# Patient Record
Sex: FEMALE | NOT HISPANIC OR LATINO | Employment: PART TIME | URBAN - METROPOLITAN AREA
[De-identification: names, ages, dates, MRNs, and addresses within clinical notes are randomized per-mention and may not be internally consistent; named-entity substitution may affect disease eponyms.]

---

## 2024-08-02 ENCOUNTER — HOSPITAL ENCOUNTER (OUTPATIENT)
Dept: RADIOLOGY | Facility: HOSPITAL | Age: 49
Discharge: HOME | End: 2024-08-02
Payer: COMMERCIAL

## 2024-08-02 DIAGNOSIS — M54.2 CERVICALGIA: ICD-10-CM

## 2024-08-02 PROCEDURE — 72050 X-RAY EXAM NECK SPINE 4/5VWS: CPT

## 2024-08-02 PROCEDURE — 72050 X-RAY EXAM NECK SPINE 4/5VWS: CPT | Performed by: RADIOLOGY

## 2024-08-11 ENCOUNTER — HOSPITAL ENCOUNTER (OUTPATIENT)
Dept: RADIOLOGY | Facility: HOSPITAL | Age: 49
Discharge: HOME | End: 2024-08-11
Payer: COMMERCIAL

## 2024-08-11 DIAGNOSIS — M54.2 CERVICALGIA: ICD-10-CM

## 2024-08-11 PROCEDURE — 72141 MRI NECK SPINE W/O DYE: CPT | Performed by: RADIOLOGY

## 2024-08-11 PROCEDURE — 72141 MRI NECK SPINE W/O DYE: CPT

## 2024-08-20 ENCOUNTER — APPOINTMENT (OUTPATIENT)
Dept: RADIOLOGY | Facility: HOSPITAL | Age: 49
End: 2024-08-20
Payer: COMMERCIAL

## 2024-08-22 ENCOUNTER — OFFICE VISIT (OUTPATIENT)
Dept: ORTHOPEDIC SURGERY | Facility: CLINIC | Age: 49
End: 2024-08-22
Payer: COMMERCIAL

## 2024-08-22 DIAGNOSIS — M19.049 CMC ARTHRITIS: Primary | ICD-10-CM

## 2024-08-22 PROCEDURE — 99204 OFFICE O/P NEW MOD 45 MIN: CPT | Performed by: ORTHOPAEDIC SURGERY

## 2024-08-22 PROCEDURE — 99214 OFFICE O/P EST MOD 30 MIN: CPT | Performed by: ORTHOPAEDIC SURGERY

## 2024-08-22 PROCEDURE — 1036F TOBACCO NON-USER: CPT | Performed by: ORTHOPAEDIC SURGERY

## 2024-08-22 RX ORDER — NARATRIPTAN 2.5 MG/1
1 TABLET ORAL DAILY
COMMUNITY

## 2024-08-22 RX ORDER — METFORMIN HYDROCHLORIDE 500 MG/1
TABLET ORAL
COMMUNITY
Start: 2024-01-10

## 2024-08-22 RX ORDER — PROGESTERONE 100 MG/1
CAPSULE ORAL
COMMUNITY

## 2024-08-22 RX ORDER — DEXTROAMPHETAMINE SACCHARATE, AMPHETAMINE ASPARTATE, DEXTROAMPHETAMINE SULFATE, AND AMPHETAMINE SULFATE 2.5; 2.5; 2.5; 2.5 MG/1; MG/1; MG/1; MG/1
TABLET ORAL
COMMUNITY

## 2024-08-22 RX ORDER — MELOXICAM 15 MG
TABLET ORAL
COMMUNITY
Start: 2024-07-17 | End: 2025-07-17

## 2024-08-22 RX ORDER — EPINEPHRINE 0.3 MG/.3ML
INJECTION SUBCUTANEOUS
COMMUNITY
Start: 2023-10-06

## 2024-08-22 RX ORDER — LEVOTHYROXINE SODIUM 62.5 UG/1
CAPSULE ORAL
COMMUNITY
Start: 2024-06-10

## 2024-08-22 RX ORDER — TESTOSTERONE 50 MG/5G
1 GEL TRANSDERMAL DAILY
COMMUNITY

## 2024-08-22 RX ORDER — MULTIVITAMIN
1 TABLET ORAL
COMMUNITY

## 2024-08-22 RX ORDER — HYDROXYZINE HYDROCHLORIDE 25 MG/1
25 TABLET, FILM COATED ORAL NIGHTLY PRN
COMMUNITY
Start: 2024-03-26

## 2024-08-22 RX ORDER — SODIUM CHLORIDE, SODIUM LACTATE, POTASSIUM CHLORIDE, CALCIUM CHLORIDE 600; 310; 30; 20 MG/100ML; MG/100ML; MG/100ML; MG/100ML
20 INJECTION, SOLUTION INTRAVENOUS CONTINUOUS
OUTPATIENT
Start: 2024-08-22

## 2024-08-22 RX ORDER — CLONAZEPAM 0.5 MG/1
1 TABLET ORAL DAILY
COMMUNITY

## 2024-08-22 RX ORDER — LEVOCETIRIZINE DIHYDROCHLORIDE 5 MG/1
TABLET, FILM COATED ORAL
COMMUNITY
Start: 2024-07-16

## 2024-08-22 RX ORDER — LIOTHYRONINE SODIUM 5 UG/1
TABLET ORAL
COMMUNITY

## 2024-08-22 RX ORDER — LEVOTHYROXINE SODIUM 25 UG/1
25 TABLET ORAL
COMMUNITY
Start: 2024-06-08

## 2024-08-22 RX ORDER — FLUTICASONE PROPIONATE 50 MCG
SPRAY, SUSPENSION (ML) NASAL
COMMUNITY

## 2024-08-22 ASSESSMENT — PATIENT HEALTH QUESTIONNAIRE - PHQ9
1. LITTLE INTEREST OR PLEASURE IN DOING THINGS: NOT AT ALL
2. FEELING DOWN, DEPRESSED OR HOPELESS: NOT AT ALL
SUM OF ALL RESPONSES TO PHQ9 QUESTIONS 1 AND 2: 0

## 2024-08-22 ASSESSMENT — ENCOUNTER SYMPTOMS
LOSS OF SENSATION IN FEET: 0
DEPRESSION: 0
OCCASIONAL FEELINGS OF UNSTEADINESS: 0

## 2024-08-22 NOTE — PROGRESS NOTES
"History of Present Illness:  Chief Complaint   Patient presents with    Right Wrist - Pain    Right Hand - Pain     Right thumb pain    Left Hand - Pain     Left thumb pain       The patient presents today for third opinion regarding bilateral basilar thumb pain.  Symptoms began about 8 years ago and have been progressive in nature.  Pain is worse with gripping, pinching and twisting.  The following treatments have been attempted: Corticosteroid injections and bracing.      History reviewed. No pertinent past medical history.    Medication Documentation Review Audit    **Prior to Admission medications have not yet been reviewed**         Allergies   Allergen Reactions    Mushroom Anaphylaxis, Other and Unknown     \"throat feels funny, bumps in mouth\"    Codeine Nausea/vomiting, GI Upset and Unknown     vomit    Colchicine GI intolerance and Unknown     Diarrhea, hangover feeling    Hydroxychloroquine Itching    Meloxicam Confusion, Dizziness and Headache    Mold Unknown and Other     patient states she gets bumps in her mouth    Sulfa (Sulfonamide Antibiotics) Hives and Unknown    Hydroxyzine Anxiety, Blurred vision, Confusion, Dizziness, Drowsiness, Headache and Other    Sulfamethoxazole-Trimethoprim Rash       Social History     Socioeconomic History    Marital status:      Spouse name: Not on file    Number of children: Not on file    Years of education: Not on file    Highest education level: Not on file   Occupational History    Not on file   Tobacco Use    Smoking status: Former     Types: Cigarettes    Smokeless tobacco: Never   Substance and Sexual Activity    Alcohol use: Yes    Drug use: Never    Sexual activity: Defer   Other Topics Concern    Not on file   Social History Narrative    Not on file     Social Determinants of Health     Financial Resource Strain: Not on file   Food Insecurity: Not on file   Transportation Needs: Not on file   Physical Activity: Patient Declined (7/25/2023)    [facility] " [facility]    Exercise Vital Sign     Days of Exercise per Week: Patient declined     Minutes of Exercise per Session: Patient declined   Stress: Stress Concern Present (7/25/2023)    Received from [facility] of Occupational Health - Occupational Stress Questionnaire     Feeling of Stress : Very much   Social Connections: Not on file   Intimate Partner Violence: Not on file   Housing Stability: Not on file       History reviewed. No pertinent surgical history.       Review of Systems   GENERAL: Negative for malaise, significant weight loss, fever  MUSCULOSKELETAL: see HPI  NEURO: Negative for numbness     Physical Examination:  Constitutional: Appears well-developed and well-nourished.  Head: Normocephalic and atraumatic.  Eyes: EOMI grossly  Cardiovascular: Intact distal pulses.   Respiratory: Effort normal. No respiratory distress.  Neurologic: Alert and oriented to person, place, and time.  Skin: Skin is warm and dry.  Hematologic / Lymphatic: No lymphedema, lymphangitis.  Psychiatric: normal mood and affect. Behavior is normal.   Musculoskeletal:  bilateral wrist/hand:  No obvious swelling or masses  No appreciable thenar atrophy  No atrophy noted of hypothenar eminence   Negative Apryl's/Phalens  Sensation grossly intact to all digits  5/5 thumb Abduction/Finger Abduction  Tenderness about thumb cmc joint with positive CMC grind.  Thumbs do have some passive MCP hyperextension, but resting in flexion.  No tenderness about first dorsal compartment/thumb A1 pulley region  Radial pulse palpable  Good capillary refill     Radiographs: Left thumb radiographs from June 24, 2024 available for my review demonstrate left thumb CMC degenerative changes.  There is some MCP hyperextension appreciated in this radiograph.     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  We discussed treatments of thumb CMC arthritis.  Non-operative modalities include symptomatic splinting, anti-inflammatories, activity modifications, hand therapy and corticosteroid injections.  We also discussed role of surgical intervention if conservative measures prove unsuccessful.    Patient has failed conservative treatment and is interested in pursuing surgical intervention.  Given the good resting posture of her MCP joint I do not believe that we will have to perform additional procedure at this time on the MCP joint.  We did discuss potential for gradual hyperextension and potential need for treatment for this.    Risks and benefits of continued nonoperative versus operative treatment options were reviewed.  The surgical benefits and the potential complications were reviewed with the patient today, as well as the day surgical setting and the likely postoperative course.  Patient understands that the goal of surgery is potential relief of some symptoms.  Risks discussed included, but were not limited to, residual/recurrent/worsening symptoms, pain, infection, bleeding, stiffness, injury to nerve/blood vessels/tendons, wound healing issues and possible need for reoperation.  I answered the patient's questions and surgical consent reviewed and obtained.  The patient has elected to proceed with surgery and we will schedule it at the patient's convenience.    The patient will followup with us in the operating room and then postoperatively.

## 2024-09-25 ENCOUNTER — LAB (OUTPATIENT)
Dept: LAB | Facility: LAB | Age: 49
End: 2024-09-25
Payer: COMMERCIAL

## 2024-09-25 DIAGNOSIS — Z00.01 ENCOUNTER FOR GENERAL ADULT MEDICAL EXAMINATION WITH ABNORMAL FINDINGS: ICD-10-CM

## 2024-09-25 DIAGNOSIS — E78.5 HYPERLIPIDEMIA, UNSPECIFIED: ICD-10-CM

## 2024-09-25 DIAGNOSIS — L65.9 NONSCARRING HAIR LOSS, UNSPECIFIED: ICD-10-CM

## 2024-09-25 DIAGNOSIS — E06.3 AUTOIMMUNE THYROIDITIS: Primary | ICD-10-CM

## 2024-09-25 DIAGNOSIS — E55.9 VITAMIN D DEFICIENCY, UNSPECIFIED: ICD-10-CM

## 2024-09-25 DIAGNOSIS — R53.83 OTHER FATIGUE: ICD-10-CM

## 2024-09-25 DIAGNOSIS — Z79.890 HORMONE REPLACEMENT THERAPY: ICD-10-CM

## 2024-09-25 DIAGNOSIS — M25.50 PAIN IN UNSPECIFIED JOINT: ICD-10-CM

## 2024-09-25 LAB
ALBUMIN SERPL BCP-MCNC: 4.3 G/DL (ref 3.4–5)
ALP SERPL-CCNC: 40 U/L (ref 33–110)
ALT SERPL W P-5'-P-CCNC: 17 U/L (ref 7–45)
ANION GAP SERPL CALC-SCNC: 12 MMOL/L (ref 10–20)
AST SERPL W P-5'-P-CCNC: 17 U/L (ref 9–39)
BASOPHILS # BLD AUTO: 0.04 X10*3/UL (ref 0–0.1)
BASOPHILS NFR BLD AUTO: 0.9 %
BILIRUB SERPL-MCNC: 0.4 MG/DL (ref 0–1.2)
BUN SERPL-MCNC: 15 MG/DL (ref 6–23)
C3 SERPL-MCNC: 92 MG/DL (ref 87–200)
C4 SERPL-MCNC: 24 MG/DL (ref 10–50)
CALCIUM SERPL-MCNC: 9.5 MG/DL (ref 8.6–10.3)
CCP IGG SERPL-ACNC: <1 U/ML
CHLORIDE SERPL-SCNC: 105 MMOL/L (ref 98–107)
CHOLEST SERPL-MCNC: 266 MG/DL (ref 0–199)
CHOLESTEROL/HDL RATIO: 3.5
CO2 SERPL-SCNC: 26 MMOL/L (ref 21–32)
CREAT SERPL-MCNC: 0.91 MG/DL (ref 0.5–1.05)
CRP SERPL HS-MCNC: 1.3 MG/L
DHEA-S SERPL-MCNC: 99 UG/DL (ref 12–379)
EGFRCR SERPLBLD CKD-EPI 2021: 77 ML/MIN/1.73M*2
EOSINOPHIL # BLD AUTO: 0.1 X10*3/UL (ref 0–0.7)
EOSINOPHIL NFR BLD AUTO: 2.2 %
ERYTHROCYTE [DISTWIDTH] IN BLOOD BY AUTOMATED COUNT: 12.6 % (ref 11.5–14.5)
ESTRADIOL SERPL-MCNC: 94 PG/ML
FERRITIN SERPL-MCNC: 119 NG/ML (ref 8–150)
GLUCOSE SERPL-MCNC: 94 MG/DL (ref 74–99)
HCT VFR BLD AUTO: 40.7 % (ref 36–46)
HDLC SERPL-MCNC: 76.2 MG/DL
HGB BLD-MCNC: 13.4 G/DL (ref 12–16)
IMM GRANULOCYTES # BLD AUTO: 0.02 X10*3/UL (ref 0–0.7)
IMM GRANULOCYTES NFR BLD AUTO: 0.4 % (ref 0–0.9)
LDLC SERPL CALC-MCNC: 174 MG/DL
LYMPHOCYTES # BLD AUTO: 1.42 X10*3/UL (ref 1.2–4.8)
LYMPHOCYTES NFR BLD AUTO: 31.3 %
MCH RBC QN AUTO: 30.9 PG (ref 26–34)
MCHC RBC AUTO-ENTMCNC: 32.9 G/DL (ref 32–36)
MCV RBC AUTO: 94 FL (ref 80–100)
MONOCYTES # BLD AUTO: 0.48 X10*3/UL (ref 0.1–1)
MONOCYTES NFR BLD AUTO: 10.6 %
NEUTROPHILS # BLD AUTO: 2.47 X10*3/UL (ref 1.2–7.7)
NEUTROPHILS NFR BLD AUTO: 54.6 %
NON HDL CHOLESTEROL: 190 MG/DL (ref 0–149)
NRBC BLD-RTO: 0 /100 WBCS (ref 0–0)
PLATELET # BLD AUTO: 289 X10*3/UL (ref 150–450)
POTASSIUM SERPL-SCNC: 4.6 MMOL/L (ref 3.5–5.3)
PROGEST SERPL-MCNC: 13.6 NG/ML
PROT SERPL-MCNC: 6.4 G/DL (ref 6.4–8.2)
RBC # BLD AUTO: 4.33 X10*6/UL (ref 4–5.2)
RHEUMATOID FACT SER NEPH-ACNC: <10 IU/ML (ref 0–15)
SODIUM SERPL-SCNC: 138 MMOL/L (ref 136–145)
TRIGL SERPL-MCNC: 77 MG/DL (ref 0–149)
TSH SERPL-ACNC: 1.24 MIU/L (ref 0.44–3.98)
VLDL: 15 MG/DL (ref 0–40)
WBC # BLD AUTO: 4.5 X10*3/UL (ref 4.4–11.3)

## 2024-09-25 PROCEDURE — 82670 ASSAY OF TOTAL ESTRADIOL: CPT

## 2024-09-25 PROCEDURE — 86038 ANTINUCLEAR ANTIBODIES: CPT

## 2024-09-25 PROCEDURE — 84402 ASSAY OF FREE TESTOSTERONE: CPT

## 2024-09-25 PROCEDURE — 85025 COMPLETE CBC W/AUTO DIFF WBC: CPT

## 2024-09-25 PROCEDURE — 36415 COLL VENOUS BLD VENIPUNCTURE: CPT

## 2024-09-25 PROCEDURE — 82642 DIHYDROTESTOSTERONE: CPT

## 2024-09-25 PROCEDURE — 84144 ASSAY OF PROGESTERONE: CPT

## 2024-09-25 PROCEDURE — 86160 COMPLEMENT ANTIGEN: CPT

## 2024-09-25 PROCEDURE — 86431 RHEUMATOID FACTOR QUANT: CPT

## 2024-09-25 PROCEDURE — 86200 CCP ANTIBODY: CPT

## 2024-09-25 PROCEDURE — 80061 LIPID PANEL: CPT

## 2024-09-25 PROCEDURE — 82627 DEHYDROEPIANDROSTERONE: CPT

## 2024-09-25 PROCEDURE — 80053 COMPREHEN METABOLIC PANEL: CPT

## 2024-09-25 PROCEDURE — 84443 ASSAY THYROID STIM HORMONE: CPT

## 2024-09-25 PROCEDURE — 82679 ASSAY OF ESTRONE: CPT

## 2024-09-25 PROCEDURE — 86141 C-REACTIVE PROTEIN HS: CPT

## 2024-09-25 PROCEDURE — 82728 ASSAY OF FERRITIN: CPT

## 2024-09-26 LAB — ANA SER QL HEP2 SUBST: NEGATIVE

## 2024-09-28 LAB — ANDROSTANOLONE SERPL-MCNC: 115.7 PG/ML (ref 24–208)

## 2024-09-29 LAB
ESTRONE SERPL-MCNC: 58 PG/ML
TESTOSTERONE FREE (CHAN): 4.8 PG/ML (ref 0.1–6.4)
TESTOSTERONE,TOTAL,LC-MS/MS: 85 NG/DL (ref 2–45)

## 2024-10-17 ENCOUNTER — ANESTHESIA EVENT (OUTPATIENT)
Dept: OPERATING ROOM | Facility: HOSPITAL | Age: 49
End: 2024-10-17
Payer: COMMERCIAL

## 2024-10-17 RX ORDER — ONDANSETRON HYDROCHLORIDE 2 MG/ML
4 INJECTION, SOLUTION INTRAVENOUS ONCE AS NEEDED
Status: CANCELLED | OUTPATIENT
Start: 2024-10-17

## 2024-10-17 RX ORDER — OXYCODONE HYDROCHLORIDE 5 MG/1
5 TABLET ORAL EVERY 4 HOURS PRN
Status: CANCELLED | OUTPATIENT
Start: 2024-10-17

## 2024-10-17 RX ORDER — SODIUM CHLORIDE, SODIUM LACTATE, POTASSIUM CHLORIDE, CALCIUM CHLORIDE 600; 310; 30; 20 MG/100ML; MG/100ML; MG/100ML; MG/100ML
100 INJECTION, SOLUTION INTRAVENOUS CONTINUOUS
Status: CANCELLED | OUTPATIENT
Start: 2024-10-17 | End: 2024-10-18

## 2024-10-17 RX ORDER — DIPHENHYDRAMINE HYDROCHLORIDE 50 MG/ML
12.5 INJECTION INTRAMUSCULAR; INTRAVENOUS ONCE AS NEEDED
Status: CANCELLED | OUTPATIENT
Start: 2024-10-17

## 2024-10-17 RX ORDER — MEPERIDINE HYDROCHLORIDE 25 MG/ML
12.5 INJECTION INTRAMUSCULAR; INTRAVENOUS; SUBCUTANEOUS EVERY 10 MIN PRN
Status: CANCELLED | OUTPATIENT
Start: 2024-10-17

## 2024-10-17 RX ORDER — ALBUTEROL SULFATE 0.83 MG/ML
2.5 SOLUTION RESPIRATORY (INHALATION) ONCE AS NEEDED
Status: CANCELLED | OUTPATIENT
Start: 2024-10-17

## 2024-10-17 RX ORDER — DROPERIDOL 2.5 MG/ML
0.62 INJECTION, SOLUTION INTRAMUSCULAR; INTRAVENOUS ONCE AS NEEDED
Status: CANCELLED | OUTPATIENT
Start: 2024-10-17

## 2024-10-18 ENCOUNTER — HOSPITAL ENCOUNTER (OUTPATIENT)
Facility: HOSPITAL | Age: 49
Setting detail: OUTPATIENT SURGERY
Discharge: HOME | End: 2024-10-18
Attending: ORTHOPAEDIC SURGERY | Admitting: ORTHOPAEDIC SURGERY
Payer: COMMERCIAL

## 2024-10-18 ENCOUNTER — ANESTHESIA (OUTPATIENT)
Dept: OPERATING ROOM | Facility: HOSPITAL | Age: 49
End: 2024-10-18
Payer: COMMERCIAL

## 2024-10-18 ENCOUNTER — PHARMACY VISIT (OUTPATIENT)
Dept: PHARMACY | Facility: CLINIC | Age: 49
End: 2024-10-18
Payer: COMMERCIAL

## 2024-10-18 VITALS
RESPIRATION RATE: 18 BRPM | OXYGEN SATURATION: 100 % | WEIGHT: 136.69 LBS | SYSTOLIC BLOOD PRESSURE: 122 MMHG | DIASTOLIC BLOOD PRESSURE: 84 MMHG | BODY MASS INDEX: 25.15 KG/M2 | HEART RATE: 69 BPM | HEIGHT: 62 IN | TEMPERATURE: 97 F

## 2024-10-18 DIAGNOSIS — M19.049 CMC ARTHRITIS: Primary | ICD-10-CM

## 2024-10-18 PROBLEM — F90.9 ADHD: Status: ACTIVE | Noted: 2024-10-18

## 2024-10-18 PROBLEM — E03.9 HYPOTHYROIDISM: Status: ACTIVE | Noted: 2024-10-18

## 2024-10-18 PROCEDURE — 3600000003 HC OR TIME - INITIAL BASE CHARGE - PROCEDURE LEVEL THREE: Performed by: ORTHOPAEDIC SURGERY

## 2024-10-18 PROCEDURE — 7100000010 HC PHASE TWO TIME - EACH INCREMENTAL 1 MINUTE: Performed by: ORTHOPAEDIC SURGERY

## 2024-10-18 PROCEDURE — 25447 ARTHRP NTRCRP/CRP/MTCR NTRPS: CPT | Performed by: ORTHOPAEDIC SURGERY

## 2024-10-18 PROCEDURE — C1713 ANCHOR/SCREW BN/BN,TIS/BN: HCPCS | Performed by: ORTHOPAEDIC SURGERY

## 2024-10-18 PROCEDURE — 2500000004 HC RX 250 GENERAL PHARMACY W/ HCPCS (ALT 636 FOR OP/ED): Performed by: NURSE ANESTHETIST, CERTIFIED REGISTERED

## 2024-10-18 PROCEDURE — 2500000001 HC RX 250 WO HCPCS SELF ADMINISTERED DRUGS (ALT 637 FOR MEDICARE OP): Performed by: ORTHOPAEDIC SURGERY

## 2024-10-18 PROCEDURE — 2500000004 HC RX 250 GENERAL PHARMACY W/ HCPCS (ALT 636 FOR OP/ED): Performed by: ANESTHESIOLOGY

## 2024-10-18 PROCEDURE — 7100000009 HC PHASE TWO TIME - INITIAL BASE CHARGE: Performed by: ORTHOPAEDIC SURGERY

## 2024-10-18 PROCEDURE — 7100000001 HC RECOVERY ROOM TIME - INITIAL BASE CHARGE: Performed by: ORTHOPAEDIC SURGERY

## 2024-10-18 PROCEDURE — 26480 TRANSPLANT HAND TENDON: CPT | Performed by: ORTHOPAEDIC SURGERY

## 2024-10-18 PROCEDURE — 3600000008 HC OR TIME - EACH INCREMENTAL 1 MINUTE - PROCEDURE LEVEL THREE: Performed by: ORTHOPAEDIC SURGERY

## 2024-10-18 PROCEDURE — 3700000002 HC GENERAL ANESTHESIA TIME - EACH INCREMENTAL 1 MINUTE: Performed by: ORTHOPAEDIC SURGERY

## 2024-10-18 PROCEDURE — RXMED WILLOW AMBULATORY MEDICATION CHARGE

## 2024-10-18 PROCEDURE — 2500000004 HC RX 250 GENERAL PHARMACY W/ HCPCS (ALT 636 FOR OP/ED): Performed by: STUDENT IN AN ORGANIZED HEALTH CARE EDUCATION/TRAINING PROGRAM

## 2024-10-18 PROCEDURE — 2500000005 HC RX 250 GENERAL PHARMACY W/O HCPCS: Performed by: ORTHOPAEDIC SURGERY

## 2024-10-18 PROCEDURE — 2500000004 HC RX 250 GENERAL PHARMACY W/ HCPCS (ALT 636 FOR OP/ED): Performed by: ORTHOPAEDIC SURGERY

## 2024-10-18 PROCEDURE — 3700000001 HC GENERAL ANESTHESIA TIME - INITIAL BASE CHARGE: Performed by: ORTHOPAEDIC SURGERY

## 2024-10-18 PROCEDURE — 2780000003 HC OR 278 NO HCPCS: Performed by: ORTHOPAEDIC SURGERY

## 2024-10-18 PROCEDURE — 7100000002 HC RECOVERY ROOM TIME - EACH INCREMENTAL 1 MINUTE: Performed by: ORTHOPAEDIC SURGERY

## 2024-10-18 PROCEDURE — 2720000007 HC OR 272 NO HCPCS: Performed by: ORTHOPAEDIC SURGERY

## 2024-10-18 DEVICE — H/W INTERNALBRACE LGMNT AUGMNT REPR KIT
Type: IMPLANTABLE DEVICE | Site: WRIST | Status: FUNCTIONAL
Brand: ARTHREX®

## 2024-10-18 RX ORDER — LIDOCAINE HYDROCHLORIDE 20 MG/ML
INJECTION, SOLUTION INFILTRATION; PERINEURAL AS NEEDED
Status: DISCONTINUED | OUTPATIENT
Start: 2024-10-18 | End: 2024-10-18

## 2024-10-18 RX ORDER — SODIUM CHLORIDE, SODIUM LACTATE, POTASSIUM CHLORIDE, CALCIUM CHLORIDE 600; 310; 30; 20 MG/100ML; MG/100ML; MG/100ML; MG/100ML
20 INJECTION, SOLUTION INTRAVENOUS CONTINUOUS
Status: DISCONTINUED | OUTPATIENT
Start: 2024-10-18 | End: 2024-10-18 | Stop reason: HOSPADM

## 2024-10-18 RX ORDER — CEFAZOLIN 1 G/1
INJECTION, POWDER, FOR SOLUTION INTRAVENOUS AS NEEDED
Status: DISCONTINUED | OUTPATIENT
Start: 2024-10-18 | End: 2024-10-18

## 2024-10-18 RX ORDER — KETOROLAC TROMETHAMINE 30 MG/ML
INJECTION, SOLUTION INTRAMUSCULAR; INTRAVENOUS AS NEEDED
Status: DISCONTINUED | OUTPATIENT
Start: 2024-10-18 | End: 2024-10-18

## 2024-10-18 RX ORDER — PROPOFOL 10 MG/ML
INJECTION, EMULSION INTRAVENOUS AS NEEDED
Status: DISCONTINUED | OUTPATIENT
Start: 2024-10-18 | End: 2024-10-18

## 2024-10-18 RX ORDER — OXYCODONE HYDROCHLORIDE 5 MG/1
5 TABLET ORAL EVERY 6 HOURS PRN
Qty: 18 TABLET | Refills: 0 | Status: SHIPPED | OUTPATIENT
Start: 2024-10-18

## 2024-10-18 RX ORDER — PROGESTERONE 100 MG/1
75 CAPSULE ORAL DAILY
COMMUNITY

## 2024-10-18 RX ORDER — MIDAZOLAM HYDROCHLORIDE 1 MG/ML
INJECTION, SOLUTION INTRAMUSCULAR; INTRAVENOUS AS NEEDED
Status: DISCONTINUED | OUTPATIENT
Start: 2024-10-18 | End: 2024-10-18

## 2024-10-18 RX ORDER — DEXMEDETOMIDINE IN 0.9 % NACL 20 MCG/5ML
SYRINGE (ML) INTRAVENOUS AS NEEDED
Status: DISCONTINUED | OUTPATIENT
Start: 2024-10-18 | End: 2024-10-18

## 2024-10-18 RX ORDER — SODIUM CHLORIDE 0.9 G/100ML
IRRIGANT IRRIGATION AS NEEDED
Status: DISCONTINUED | OUTPATIENT
Start: 2024-10-18 | End: 2024-10-18 | Stop reason: HOSPADM

## 2024-10-18 RX ORDER — FENTANYL CITRATE 50 UG/ML
INJECTION, SOLUTION INTRAMUSCULAR; INTRAVENOUS AS NEEDED
Status: DISCONTINUED | OUTPATIENT
Start: 2024-10-18 | End: 2024-10-18

## 2024-10-18 RX ORDER — LIOTHYRONINE SODIUM 5 UG/1
5 TABLET ORAL DAILY
COMMUNITY

## 2024-10-18 RX ORDER — CHLORHEXIDINE GLUCONATE 40 MG/ML
SOLUTION TOPICAL AS NEEDED
Status: DISCONTINUED | OUTPATIENT
Start: 2024-10-18 | End: 2024-10-18 | Stop reason: HOSPADM

## 2024-10-18 RX ORDER — TESTOSTERONE GEL, 1% 10 MG/G
50 GEL TRANSDERMAL DAILY
COMMUNITY

## 2024-10-18 RX ORDER — ONDANSETRON HYDROCHLORIDE 2 MG/ML
INJECTION, SOLUTION INTRAVENOUS AS NEEDED
Status: DISCONTINUED | OUTPATIENT
Start: 2024-10-18 | End: 2024-10-18

## 2024-10-18 SDOH — HEALTH STABILITY: MENTAL HEALTH: CURRENT SMOKER: 0

## 2024-10-18 ASSESSMENT — PAIN SCALES - GENERAL
PAINLEVEL_OUTOF10: 0 - NO PAIN

## 2024-10-18 ASSESSMENT — PAIN - FUNCTIONAL ASSESSMENT
PAIN_FUNCTIONAL_ASSESSMENT: 0-10

## 2024-10-18 NOTE — ANESTHESIA PREPROCEDURE EVALUATION
Patient: Sandra Delgado    Procedure Information       Date/Time: 10/18/24 0915    Procedure: THUMB BASILAR JOINT ARTHROPLASTY (Left: Wrist) - with regional block    Location: [facility] / [facility]    Surgeons: Bishop Castro MD            Relevant Problems   Anesthesia (within normal limits)      Cardiac (within normal limits)      Pulmonary (within normal limits)      Endocrine   (+) Hypothyroidism      Musculoskeletal   (+) CMC arthritis       Clinical information reviewed:   Tobacco  Allergies  Meds   Med Hx  Surg Hx  OB Status  Fam Hx  Soc   Hx        NPO Detail:  NPO/Void Status  Carbohydrate Drink Given Prior to Surgery? : N  Date of Last Liquid: 10/17/24  Time of Last Liquid: 2300  Date of Last Solid: 10/17/24  Time of Last Solid: 2300  Last Intake Type: Clear fluids  Time of Last Void: 0700         Physical Exam    Airway  Mallampati: II  TM distance: >3 FB  Neck ROM: full     Cardiovascular - normal exam     Dental - normal exam     Pulmonary - normal exam     Abdominal - normal exam             Anesthesia Plan    History of general anesthesia?: yes  History of complications of general anesthesia?: no    ASA 2     general and regional     The patient is not a current smoker.    intravenous induction   Postoperative administration of opioids is intended.  Trial extubation is planned.  Anesthetic plan and risks discussed with patient.  Use of blood products discussed with patient who consented to blood products.    Plan discussed with CRNA and attending.

## 2024-10-18 NOTE — ANESTHESIA PROCEDURE NOTES
Peripheral Block    Patient location during procedure: pre-op  Start time: 10/18/2024 9:00 AM  End time: 10/18/2024 9:15 AM  Reason for block: at surgeon's request and post-op pain management  Staffing  Performed: attending   Authorized by: Jasmin Dacosta MD    Performed by: Jasmin Dacosta MD  Preanesthetic Checklist  Completed: patient identified, IV checked, site marked, risks and benefits discussed, surgical consent, monitors and equipment checked, pre-op evaluation and timeout performed   Timeout performed at: 10/18/2024 9:00 AM  Peripheral Block  Patient position: laying flat  Prep: ChloraPrep  Patient monitoring: heart rate and continuous pulse ox  Block type: infraclavicular  Laterality: left  Injection technique: single-shot  Guidance: ultrasound guided  Local infiltration: lidocaine  Needle  Needle type: short-bevel   Needle gauge: 26 G  Needle length: 5 cm  Needle localization: ultrasound guidance  Assessment  Injection assessment: negative aspiration for heme, no paresthesia on injection, incremental injection and local visualized surrounding nerve on ultrasound  Paresthesia pain: none  Heart rate change: no  Additional Notes  Injected 20cc of bupivicaine 0.5%   2mg of versed preop

## 2024-10-18 NOTE — H&P
"History of Present Illness:       Chief Complaint   Patient presents with    Right Wrist - Pain    Right Hand - Pain       Right thumb pain    Left Hand - Pain       Left thumb pain         Patient presents for thumb CMC arthroplasty, left hand.  She has failed conservative treatment.     Medical History   History reviewed. No pertinent past medical history.        Medication Documentation Review Audit    **Prior to Admission medications have not yet been reviewed**            RX Allergies         Allergies   Allergen Reactions    Mushroom Anaphylaxis, Other and Unknown       \"throat feels funny, bumps in mouth\"    Codeine Nausea/vomiting, GI Upset and Unknown       vomit    Colchicine GI intolerance and Unknown       Diarrhea, hangover feeling    Hydroxychloroquine Itching    Meloxicam Confusion, Dizziness and Headache    Mold Unknown and Other       patient states she gets bumps in her mouth    Sulfa (Sulfonamide Antibiotics) Hives and Unknown    Hydroxyzine Anxiety, Blurred vision, Confusion, Dizziness, Drowsiness, Headache and Other    Sulfamethoxazole-Trimethoprim Rash            Social History               Socioeconomic History    Marital status:        Spouse name: Not on file    Number of children: Not on file    Years of education: Not on file    Highest education level: Not on file   Occupational History    Not on file   Tobacco Use    Smoking status: Former       Types: Cigarettes    Smokeless tobacco: Never   Substance and Sexual Activity    Alcohol use: Yes    Drug use: Never    Sexual activity: Defer   Other Topics Concern    Not on file   Social History Narrative    Not on file      Social Determinants of Health           Financial Resource Strain: Not on file   Food Insecurity: Not on file   Transportation Needs: Not on file   Physical Activity: Patient Declined (7/25/2023)     [facility]     Exercise Vital Sign      Days of Exercise per Week: Patient " declined      Minutes of Exercise per Session: Patient declined   Stress: Stress Concern Present (7/25/2023)     Received from [facility] [place] of Occupational Health - Occupational Stress Questionnaire      Feeling of Stress : Very much   Social Connections: Not on file   Intimate Partner Violence: Not on file   Housing Stability: Not on file            Surgical History   History reviewed. No pertinent surgical history.           Review of Systems   GENERAL: Negative for malaise, significant weight loss, fever  MUSCULOSKELETAL: see HPI  NEURO: Negative for numbness     Physical Examination:  Constitutional: Appears well-developed and well-nourished.  Head: Normocephalic and atraumatic.  Eyes: EOMI grossly  Cardiovascular: Intact distal pulses.   Respiratory: Effort normal. No respiratory distress.  Neurologic: Alert and oriented to person, place, and time.  Skin: Skin is warm and dry.  Hematologic / Lymphatic: No lymphedema, lymphangitis.  Psychiatric: normal mood and affect. Behavior is normal.   Musculoskeletal:  bilateral wrist/hand:  No obvious swelling or masses  No appreciable thenar atrophy  No atrophy noted of hypothenar eminence   Negative Apryl's/Phalens  Sensation grossly intact to all digits  5/5 thumb Abduction/Finger Abduction  Tenderness about thumb cmc joint with positive CMC grind.  Thumbs do have some passive MCP hyperextension, but resting in flexion.  No tenderness about first dorsal compartment/thumb A1 pulley region  Radial pulse palpable  Good capillary refill     Radiographs: Left thumb radiographs from June 24, 2024 available for my review demonstrate left thumb CMC degenerative changes.  There is some MCP hyperextension appreciated in this radiograph.     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

## 2024-10-18 NOTE — ANESTHESIA POSTPROCEDURE EVALUATION
Patient: Sandra Delgado    Procedure Summary       Date: 10/18/24 Room / Location: [facility] / [facility]    Anesthesia Start: 0929 Anesthesia Stop: 1055    Procedure: THUMB BASILAR JOINT ARTHROPLASTY (Left: Wrist) Diagnosis:       CMC arthritis      (CMC arthritis [M19.049])    Surgeons: Bishop Castro MD Responsible Provider: Jasmin Dacosta MD    Anesthesia Type: general, regional ASA Status: 2            Anesthesia Type: general, regional    Vitals Value Taken Time   /79 10/18/24 1120   Temp 36.1 °C (97 °F) 10/18/24 1055   Pulse 79 10/18/24 1120   Resp 17 10/18/24 1120   SpO2 99 % 10/18/24 1120       Anesthesia Post Evaluation    Patient location during evaluation: PACU  Patient participation: complete - patient participated  Level of consciousness: awake and alert  Pain management: adequate  Airway patency: patent  Cardiovascular status: acceptable  Respiratory status: acceptable  Hydration status: acceptable  Postoperative Nausea and Vomiting: none        There were no known notable events for this encounter.

## 2024-10-18 NOTE — OP NOTE
Pre-Operative Diagnosis: Left thumb basilar joint arthritis  Post-Operative Diagnosis: same  Procedure: Left thumb basilar joint arthroplasty with APL tendon transfer  Surgeon: Matthew  Assistant: Gaston  Anesthesia: General With regional block  Complications: none  Estimated blood loss: Minimal  Specimen: None  Implants:  Implant Name Type Inv. Item Serial No.  Lot No. LRB No. Used Action   REPAIR KIT, LIGAMENT AUGMENTATION, HAND/WRIST INTERNABRACE - [id number] Implant REPAIR KIT, LIGAMENT AUGMENTATION, HAND/WRIST INTERNABRACE  [employer] [id number] Left 1 Implanted     Findings: See below  Disposition: Good/PACU      Indications: Patient with symptomatic left thumb CMC arthritis that is failed response to conservative treatment.  After thoroughly reviewing associated risks and benefits patient wished to proceed with surgical intervention.  Expected operative and postoperative courses reviewed and questions addressed.    Operative course: Patient was greeted in the preoperative holding area and the operative site was marked with indelible marker.  Regional block was performed by the anesthesia team in the preoperative holding area.  Patient was then brought back to the operating room suite where general anesthetic was induced by the anesthesia team.  Left upper extremities prepped and draped in standard sterile fashion timeout procedure was performed as per standard protocol.  Esmarch was used to exsanguinate left upper extremity and upper arm tourniquet was inflated.  Longitudinal incision was made directly overlying the thumb CMC joint at the border between the rugal and non-rugal skin.  Gentle spreading was carried down through the subcutaneous tissues and 2 branches of the dorsal radial sensory nerve were identified and protected with gentle blunt retraction throughout the remainder of the case.  The thumb CMC capsule was then sharply incised in line with the incision and capsule was elevated off of  the trapezium.  The trapezium was then excised with rongeur after carefully elevating off the capsule.  FCR was identified and protected.  The scaphoid-trapezoid joint was inspected and had good cartilage without any notable signs of degenerative changes.  The first dorsal compartment tendons were then traced proximally.  While carefully protecting the radial sensory nerve and its branches the first dorsal compartment was released volarly.  A strip of the APL was then harvested, leaving its thumb metacarpal attachment intact.  An anchor was placed into the base of the thumb metacarpal with suture tape.  A second anchor was then placed at the base of the second metacarpal and the other end of the suture tape as well as the APL tendon were inserted into the base of the second metacarpal.  This was performed while holding the thumb in gentle axial traction and with the thumb adducted to the index finger.  Following this the thumb remained stable to axial load and had excellent abduction/adduction as well as flexion/extension.  Hand was able to lay flat on the table and tip of thumb was able to oppose to the MCP flexion crease at the base of the small finger.  Wound was then irrigated and capsule was repaired with 3-0 Vicryl in a buried interrupted fashion.  Skin was reapproximated with 4-0 Monocryl followed by Prineo mesh on the skin.  Dry sterile dressings were applied followed by thumb spica splint holding the thumb in appropriate positioning.  Patient was awoken from anesthesia uneventfully and taken the recovery for further care.    She will follow-up in 2 weeks for wound check and plan on transitioning into hand therapy fabricated forearm-based thumb spica Thermoplast splint.  She will remain nonweightbearing to the thumb and begin active motion at 4 weeks postoperatively.

## 2024-10-18 NOTE — DISCHARGE INSTRUCTIONS
Dr. Castro Post-Operative Instructions  Hand/Wrist/Elbow    Activity:  Rest on the day of surgery.  Gradually resume your regular diet, beginning with clear liquids and progressing as you feel ready.  No driving if you had anesthesia.    Anesthesia:  You may feel dizzy, sleepy or lightheaded for up to 24 hours after surgery.  If you had general anesthesia you may have a sore throat for 1-2 days.  If you had a nerve block wear a sling until nerve function returns for your safety.  Nerve block may last 18-36 hours.    Post-Operative Medications:  You may resume your regular medications (including blood thinners if you stopped them).  You have been given a prescription for pain medication as needed.  You may also take over-the-counter anti-inflammatories and/or Tylenol for pain relief.  If you are taking the prescribed pain medication you must limit additional Tylenol (acetaminophen) intake to avoid overdose.    Dressings:  Keep your splint clean and dry.  You may cover with a plastic bag or cast cover and seal bag to your skin above the bandage for showering.  If your dressing becomes wet or significantly bloodstained call the office at [phone].    Post-Operative Care:  Keep the surgical site elevated above the level of your heart to limit swelling.  If your fingers are not included in the dressing you are encouraged to move your fingers regularly (full fist and full extension).  Regularly ice the surgical site.  You may also apply ice pack above the level of the dressing and this will cool the blood as it travels towards the surgical site.    Call Surgeon/Office at any time for:           Office Number: [phone]  Excessive bleeding  Loss of feeling (The local numbing medicine from surgery typically lasts 8-12 hours.  Nerve blocks can last 18-36 hours.)  Tight dressing: Make sure that you are elevating the operative site appropriately.  If no relief then call the office.                                                                                                         Circulation issues:  If fingers change to white or blue  Concerns/Problems with your surgery

## 2024-10-18 NOTE — ANESTHESIA PROCEDURE NOTES
Airway  Date/Time: 10/18/2024 9:33 AM  Urgency: elective    Airway not difficult    Staffing  Performed: CRNA   Authorized by: Jasmin Dacosta MD    Performed by: JORGE Calix-ELLIE  Patient location during procedure: OR    Indications and Patient Condition  Indications for airway management: anesthesia  Spontaneous Ventilation: absent  Sedation level: deep  Preoxygenated: yes  Patient position: sniffing  Mask difficulty assessment: 1 - vent by mask  Planned trial extubation    Final Airway Details  Final airway type: supraglottic airway      Successful airway: Size 4     Number of attempts at approach: 1    Additional Comments  Igel easy mask ventilation and easy atraumatic LMA placement.

## 2024-10-22 ENCOUNTER — TELEPHONE (OUTPATIENT)
Dept: ORTHOPEDIC SURGERY | Facility: CLINIC | Age: 49
End: 2024-10-22
Payer: COMMERCIAL

## 2024-10-22 NOTE — TELEPHONE ENCOUNTER
10/18/24 LT THUMB BASILAR JOINT ARTHROPLASTY   Patient called and wants to know if she should continue to take the Aleve, she was prescribed Prednisone for swelling in her eye from Dermatology. She wasn't sure if the Prednisone would help her hand as well so she wouldn't need the Aleve. Her Derm told her to ask us.

## 2024-10-22 NOTE — TELEPHONE ENCOUNTER
Spoke with Dr. Castro and he said that is fine. Spoke with patient and she has verbalized understanding.

## 2024-10-31 ENCOUNTER — LAB (OUTPATIENT)
Dept: LAB | Facility: LAB | Age: 49
End: 2024-10-31
Payer: COMMERCIAL

## 2024-10-31 ENCOUNTER — OFFICE VISIT (OUTPATIENT)
Dept: ORTHOPEDIC SURGERY | Facility: CLINIC | Age: 49
End: 2024-10-31
Payer: COMMERCIAL

## 2024-10-31 ENCOUNTER — EVALUATION (OUTPATIENT)
Dept: OCCUPATIONAL THERAPY | Facility: CLINIC | Age: 49
End: 2024-10-31
Payer: COMMERCIAL

## 2024-10-31 DIAGNOSIS — M79.645 CHRONIC THUMB PAIN, LEFT: Primary | ICD-10-CM

## 2024-10-31 DIAGNOSIS — M19.049 CMC ARTHRITIS: ICD-10-CM

## 2024-10-31 DIAGNOSIS — E78.5 HYPERLIPIDEMIA, UNSPECIFIED: ICD-10-CM

## 2024-10-31 DIAGNOSIS — E55.9 VITAMIN D DEFICIENCY, UNSPECIFIED: ICD-10-CM

## 2024-10-31 DIAGNOSIS — Z00.01 ENCOUNTER FOR GENERAL ADULT MEDICAL EXAMINATION WITH ABNORMAL FINDINGS: ICD-10-CM

## 2024-10-31 DIAGNOSIS — L65.9 NONSCARRING HAIR LOSS, UNSPECIFIED: ICD-10-CM

## 2024-10-31 DIAGNOSIS — Z79.890 HORMONE REPLACEMENT THERAPY: ICD-10-CM

## 2024-10-31 DIAGNOSIS — G89.29 CHRONIC THUMB PAIN, LEFT: Primary | ICD-10-CM

## 2024-10-31 DIAGNOSIS — M25.50 PAIN IN UNSPECIFIED JOINT: ICD-10-CM

## 2024-10-31 DIAGNOSIS — E06.3 AUTOIMMUNE THYROIDITIS: Primary | ICD-10-CM

## 2024-10-31 DIAGNOSIS — R53.83 OTHER FATIGUE: ICD-10-CM

## 2024-10-31 LAB
ALBUMIN SERPL BCP-MCNC: 4.2 G/DL (ref 3.4–5)
ALP SERPL-CCNC: 37 U/L (ref 33–110)
ALT SERPL W P-5'-P-CCNC: 15 U/L (ref 7–45)
AMYLASE SERPL-CCNC: 60 U/L (ref 29–103)
ANION GAP SERPL CALC-SCNC: 12 MMOL/L (ref 10–20)
AST SERPL W P-5'-P-CCNC: 14 U/L (ref 9–39)
BASOPHILS # BLD AUTO: 0.06 X10*3/UL (ref 0–0.1)
BASOPHILS NFR BLD AUTO: 1.3 %
BILIRUB SERPL-MCNC: 0.5 MG/DL (ref 0–1.2)
BUN SERPL-MCNC: 24 MG/DL (ref 6–23)
CALCIUM SERPL-MCNC: 9.6 MG/DL (ref 8.6–10.3)
CHLORIDE SERPL-SCNC: 103 MMOL/L (ref 98–107)
CO2 SERPL-SCNC: 27 MMOL/L (ref 21–32)
CREAT SERPL-MCNC: 1.01 MG/DL (ref 0.5–1.05)
EGFRCR SERPLBLD CKD-EPI 2021: 68 ML/MIN/1.73M*2
EOSINOPHIL # BLD AUTO: 0.13 X10*3/UL (ref 0–0.7)
EOSINOPHIL NFR BLD AUTO: 2.9 %
ERYTHROCYTE [DISTWIDTH] IN BLOOD BY AUTOMATED COUNT: 12.2 % (ref 11.5–14.5)
GLUCOSE SERPL-MCNC: 96 MG/DL (ref 74–99)
HCT VFR BLD AUTO: 40.9 % (ref 36–46)
HGB BLD-MCNC: 13.8 G/DL (ref 12–16)
IMM GRANULOCYTES # BLD AUTO: 0.02 X10*3/UL (ref 0–0.7)
IMM GRANULOCYTES NFR BLD AUTO: 0.4 % (ref 0–0.9)
LIPASE SERPL-CCNC: 20 U/L (ref 9–82)
LYMPHOCYTES # BLD AUTO: 2.14 X10*3/UL (ref 1.2–4.8)
LYMPHOCYTES NFR BLD AUTO: 47.6 %
MCH RBC QN AUTO: 31.2 PG (ref 26–34)
MCHC RBC AUTO-ENTMCNC: 33.7 G/DL (ref 32–36)
MCV RBC AUTO: 92 FL (ref 80–100)
MONOCYTES # BLD AUTO: 0.44 X10*3/UL (ref 0.1–1)
MONOCYTES NFR BLD AUTO: 9.8 %
NEUTROPHILS # BLD AUTO: 1.71 X10*3/UL (ref 1.2–7.7)
NEUTROPHILS NFR BLD AUTO: 38 %
NRBC BLD-RTO: 0 /100 WBCS (ref 0–0)
PLATELET # BLD AUTO: 324 X10*3/UL (ref 150–450)
POTASSIUM SERPL-SCNC: 4.5 MMOL/L (ref 3.5–5.3)
PROT SERPL-MCNC: 6.5 G/DL (ref 6.4–8.2)
RBC # BLD AUTO: 4.43 X10*6/UL (ref 4–5.2)
SODIUM SERPL-SCNC: 137 MMOL/L (ref 136–145)
TSH SERPL-ACNC: 2.95 MIU/L (ref 0.44–3.98)
WBC # BLD AUTO: 4.5 X10*3/UL (ref 4.4–11.3)

## 2024-10-31 PROCEDURE — 84443 ASSAY THYROID STIM HORMONE: CPT

## 2024-10-31 PROCEDURE — 1036F TOBACCO NON-USER: CPT | Performed by: ORTHOPAEDIC SURGERY

## 2024-10-31 PROCEDURE — L3808 WHFO, RIGID W/O JOINTS: HCPCS

## 2024-10-31 PROCEDURE — 85025 COMPLETE CBC W/AUTO DIFF WBC: CPT

## 2024-10-31 PROCEDURE — 36415 COLL VENOUS BLD VENIPUNCTURE: CPT

## 2024-10-31 PROCEDURE — 83690 ASSAY OF LIPASE: CPT

## 2024-10-31 PROCEDURE — 99211 OFF/OP EST MAY X REQ PHY/QHP: CPT | Performed by: ORTHOPAEDIC SURGERY

## 2024-10-31 PROCEDURE — 80053 COMPREHEN METABOLIC PANEL: CPT

## 2024-10-31 PROCEDURE — 82150 ASSAY OF AMYLASE: CPT

## 2024-10-31 ASSESSMENT — PATIENT HEALTH QUESTIONNAIRE - PHQ9
SUM OF ALL RESPONSES TO PHQ9 QUESTIONS 1 AND 2: 0
1. LITTLE INTEREST OR PLEASURE IN DOING THINGS: NOT AT ALL
2. FEELING DOWN, DEPRESSED OR HOPELESS: NOT AT ALL

## 2024-10-31 ASSESSMENT — ENCOUNTER SYMPTOMS
OCCASIONAL FEELINGS OF UNSTEADINESS: 0
LOSS OF SENSATION IN FEET: 0
DEPRESSION: 0

## 2024-11-01 ENCOUNTER — TELEPHONE (OUTPATIENT)
Dept: ORTHOPEDIC SURGERY | Facility: CLINIC | Age: 49
End: 2024-11-01
Payer: COMMERCIAL

## 2024-11-01 NOTE — TELEPHONE ENCOUNTER
Patient called and requested a letter to be written to have her return to work with restrictions. 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 Would remain non weight bearing to her thumb. Would like to go back to work a few days a week to allow time for occupational therapy. I believe that sounds reasonable but I wanted to check with Dr. Castro to get an OK from him before writing the letter. Patient has verbalized understanding and will be awaiting a call from me next week.

## 2024-11-13 ENCOUNTER — APPOINTMENT (OUTPATIENT)
Dept: OCCUPATIONAL THERAPY | Facility: CLINIC | Age: 49
End: 2024-11-13
Payer: COMMERCIAL

## 2024-11-15 ENCOUNTER — TREATMENT (OUTPATIENT)
Dept: OCCUPATIONAL THERAPY | Facility: CLINIC | Age: 49
End: 2024-11-15
Payer: COMMERCIAL

## 2024-11-15 DIAGNOSIS — M79.645 CHRONIC THUMB PAIN, LEFT: ICD-10-CM

## 2024-11-15 DIAGNOSIS — G89.29 CHRONIC THUMB PAIN, LEFT: ICD-10-CM

## 2024-11-15 PROCEDURE — 97110 THERAPEUTIC EXERCISES: CPT | Mod: GO

## 2024-11-15 PROCEDURE — L3923 HFO WITHOUT JOINTS PRE CST: HCPCS

## 2024-11-15 NOTE — PROGRESS NOTES
"Occupational Therapy    Occupational Therapy Treatment    Name: Sandra Delgado  MRN: [id number]  : 1975  Date: 11/15/2024  Time Calculation  Start Time: 0850  Stop Time: 30  Time Calculation (min): 40 min  OT Therapeutic Procedures Time Entry  Therapeutic Exercise Time Entry: 15,      Current Problem:  Problem List Items Addressed This Visit             ICD-10-CM    Chronic thumb pain, left M79.645, G89.29       Assessment: Pt is doing well 4 weeks post op and adhering to precautions.  Reports pain is subsiding L thumb at rest. Pt instructed in AROM exercises for the next 2 weeks and to avoid gripping and pinching activities. AROM L thumb is decreased as expected.      Plan: Can begin gentle AROM exercises for the next 2 weeks and can transition to short opponens orthosis in 2 weeks.        Subjective It's feeling better and doesn't hurt as much\"  General: Pt is 4 weeks post op L thumb CMC arthroplasty        Pain Assessment:   2/10 L thumb/wrist       Objective    Left Hand AROM (degrees)    MCP PIP DIP DPC   IF        0   MF       0   RF       0   SF       0   Thumb 0/20   0/50     Thumb RABD 20         Thumb PABD  20            Pt able to oppose thumb to index finger only     Modalities:     Communication:     Splinting:   Fabricated short opponens orthosis for the pt to transition to in 2 weeks since she will be out of town at 6 weeks post op  Therapeutic Exercise: 10 reps each    Thumb IP flexion   Thumb MP flexion   Composite thumb flexion   Opposition   Radial and palmar abduction    Manual Therapy   Scar massage    Therapy/Activity:   Strength:     Other Activity:     Outcome Measures:      OP EDUCATION: Pt instructed in AROM exercises and HEP handout issued        Goals:  1. Pt will achieve 130 degrees MARTINEZ L thumb to improve performance with ADL's in 4-6 weeks  2. Pt will have full opposition in 4 weeks  3. Pt will have 40 lbs of  strength and 12 lbs of 3 pt and lat pinch to improve ability to " open jars and bottle caps in 6 weeks.   4. Pt will score a 60 or better on UEFI in 6 weeks  5. Pt will have 0/10 L thumb pain with ADL's in 6 weeks   6. Pt will be independent with don/doffing long thumb spica and verbalize thumb CMC precautions in 1 visit. Met

## 2024-12-02 ENCOUNTER — APPOINTMENT (OUTPATIENT)
Dept: ORTHOPEDIC SURGERY | Facility: CLINIC | Age: 49
End: 2024-12-02
Payer: COMMERCIAL

## 2024-12-02 DIAGNOSIS — M19.049 CMC ARTHRITIS: Primary | ICD-10-CM

## 2024-12-02 PROCEDURE — 1036F TOBACCO NON-USER: CPT | Performed by: ORTHOPAEDIC SURGERY

## 2024-12-02 PROCEDURE — L3924 HFO WITHOUT JOINTS PRE OTS: HCPCS | Performed by: ORTHOPAEDIC SURGERY

## 2024-12-02 PROCEDURE — 99024 POSTOP FOLLOW-UP VISIT: CPT | Performed by: ORTHOPAEDIC SURGERY

## 2024-12-02 ASSESSMENT — PAIN - FUNCTIONAL ASSESSMENT: PAIN_FUNCTIONAL_ASSESSMENT: NO/DENIES PAIN

## 2024-12-02 NOTE — PROGRESS NOTES
History of Present Illness  Chief Complaint   Patient presents with    Left Hand - Post-op     10/18/24 L thumb CMC arthroplasty     Patient continues to progress with therapy.  She has transition to hand-based CMC thermoplastic point.     Examination  Left thumb:  Incision is well healed. No signs of infection.  Sensation remains intact distally.  Capillary refill less than 2 seconds.  Thumb sitting in good position  Able to oppose tip of thumb to MCP flexion crease of small finger     Assessment:  Patient status post left thumb CMC arthroplasty, progressing well     Plan  We reviewed recommendation for continued gradual mobilization.  She will begin with strengthening under therapy guidance.  We also once again reviewed expected recovery course and questions addressed.  She will follow-up with me in about 6 weeks for clinical check.    Patient has utilized CMC splint for her right hand in the past, but requesting new brace.  This was fitted in clinic.

## 2024-12-04 ENCOUNTER — TREATMENT (OUTPATIENT)
Dept: OCCUPATIONAL THERAPY | Facility: CLINIC | Age: 49
End: 2024-12-04
Payer: COMMERCIAL

## 2024-12-04 DIAGNOSIS — G89.29 CHRONIC THUMB PAIN, LEFT: Primary | ICD-10-CM

## 2024-12-04 DIAGNOSIS — M79.645 CHRONIC THUMB PAIN, LEFT: Primary | ICD-10-CM

## 2024-12-04 PROCEDURE — 97110 THERAPEUTIC EXERCISES: CPT | Mod: GO

## 2024-12-04 PROCEDURE — 97140 MANUAL THERAPY 1/> REGIONS: CPT | Mod: GO

## 2024-12-04 NOTE — PROGRESS NOTES
"Occupational Therapy    Occupational Therapy    Occupational Therapy Treatment    Name: Sandra Delgado  MRN: [id number]  : 1975  Date: 2024  Time Calculation  Start Time: 0850  Stop Time: 915  Time Calculation (min): 25 min  OT Therapeutic Procedures Time Entry  Manual Therapy Time Entry: 15  Therapeutic Exercise Time Entry: 10,      Current Problem:  Problem List Items Addressed This Visit             ICD-10-CM    Chronic thumb pain, left - Primary M79.645, G89.29         Assessment: Pt demonstrates full AROM L thumb and wrist.  Pinch strength decreased      Plan: Pt can begin gentle strengthening.  Continue to avoid  heavy gripping and pinching        Subjective \"I have just a little discomfort when I bend my wrist back\"  General: Pt is 6.5 weeks post op L thumb CMC arthroplasty        Pain Assessment:   1/10 L thumb/wrist       Objective    Left Hand AROM (degrees)    MCP PIP DIP DPC   IF        0   MF       0   RF       0   SF       0   Thumb 0/50   0/90     Thumb RABD 40         Thumb PABD  40            Pt able to oppose thumb to digits 2-5  L Lat pinch strength: 5 lbs    Modalities:     Communication:     Splinting:     Therapeutic Exercise: 10 reps each    Isometric \"C\"  Index finger abduction with rubber band   Manual Therapy   Scar massage  Passive composite thumb flexion stretch   Therapy/Activity:   Strength:     Other Activity:     Outcome Measures:      OP EDUCATION: Pt instructed in AROM exercises and HEP handout issued        Goals:  1. Pt will achieve 130 degrees MARTINEZ L thumb to improve performance with ADL's in 4-6 weeks. Met  2. Pt will have full opposition in 4 weeks. Met  3. Pt will have 40 lbs of  strength and 12 lbs of 3 pt and lat pinch to improve ability to open jars and bottle caps in 6 weeks.   4. Pt will score a 60 or better on UEFI in 6 weeks  5. Pt will have 0/10 L thumb pain with ADL's in 6 weeks . Met  6. Pt will be independent with don/doffing long thumb spica and " verbalize thumb CMC precautions in 1 visit. Met

## 2024-12-16 ENCOUNTER — TREATMENT (OUTPATIENT)
Dept: OCCUPATIONAL THERAPY | Facility: CLINIC | Age: 49
End: 2024-12-16
Payer: COMMERCIAL

## 2024-12-16 DIAGNOSIS — G89.29 CHRONIC THUMB PAIN, LEFT: Primary | ICD-10-CM

## 2024-12-16 DIAGNOSIS — M79.645 CHRONIC THUMB PAIN, LEFT: Primary | ICD-10-CM

## 2024-12-16 PROCEDURE — 97140 MANUAL THERAPY 1/> REGIONS: CPT | Mod: GO

## 2024-12-16 PROCEDURE — 97110 THERAPEUTIC EXERCISES: CPT | Mod: GO

## 2024-12-16 NOTE — PROGRESS NOTES
"    Occupational Therapy    Occupational Therapy Treatment/Discharge Summary     Name: Sandra Delgado  MRN: [id number]  : 1975  Date: 2024  Time Calculation  Start Time: 1600  Stop Time: 1630  Time Calculation (min): 30 min  OT Therapeutic Procedures Time Entry  Manual Therapy Time Entry: 10  Therapeutic Exercise Time Entry: 20,      Current Problem:  Problem List Items Addressed This Visit             ICD-10-CM    Chronic thumb pain, left - Primary M79.645, G89.29           Assessment: Pt demonstrates full AROM L thumb and wrist.  Has achieved goals. Will continue with HEP for further strength gains       Plan: Discharge OT       Subjective \"I haven't done a lot this last week due to being sick \"  General: Pt is 8 weeks post op        Pain Assessment:  0- 1/10 L thumb/wrist       Objective    Left Hand AROM (degrees)    MCP PIP DIP DPC   IF        0   MF       0   RF       0   SF       0   Thumb 0/50   0/90     Thumb RABD 40         Thumb PABD  40            Pt able to oppose thumb to digits 2-5  L Lat pinch strength: 12 lbs    Modalities:     Communication:     Splinting:     Therapeutic Exercise: 10 reps each with yellow putty    3 pt pinch   Full composite squeeze  Finger extension   Index finger abduction   Manual Therapy   Scar massage  Passive composite thumb flexion stretch   Therapy/Activity:   Strength:     Other Activity:     Outcome Measures:    UEFI: 60  OP EDUCATION: Pt instructed in strengthening exercises and HEP handout issued      Access Code: [email or web address]  URL: [email or web address]  Date: 2024  Prepared by: David Lemons    Exercises  - Finger Extension with Putty  - 1 x daily - 7 x weekly - 3 sets - 10 reps  - 3-Point Pinch with Putty  - 1 x daily - 7 x weekly - 3 sets - 10 reps  - Finger Abduction with Putty  - 1 x daily - 7 x weekly - 3 sets - 10 reps  - Putty Squeezes  - 1 x daily - 7 x weekly - 3 sets - 10 reps  Goals:  1. Pt will achieve 130 degrees MARTINEZ L " thumb to improve performance with ADL's in 4-6 weeks. Met  2. Pt will have full opposition in 4 weeks. Met  3. Pt will have 40 lbs of  strength and 12 lbs of 3 pt and lat pinch to improve ability to open jars and bottle caps in 6 weeks. Met  4. Pt will score a 60 or better on UEFI in 6 week. Met  5. Pt will have 0/10 L thumb pain with ADL's in 6 weeks . Met  6. Pt will be independent with don/doffing long thumb spica and verbalize thumb CMC precautions in 1 visit. Met

## 2024-12-18 ENCOUNTER — APPOINTMENT (OUTPATIENT)
Dept: OCCUPATIONAL THERAPY | Facility: CLINIC | Age: 49
End: 2024-12-18
Payer: COMMERCIAL

## 2024-12-31 ENCOUNTER — LAB (OUTPATIENT)
Dept: LAB | Facility: LAB | Age: 49
End: 2024-12-31
Payer: COMMERCIAL

## 2024-12-31 DIAGNOSIS — E06.3 AUTOIMMUNE THYROIDITIS: Primary | ICD-10-CM

## 2024-12-31 DIAGNOSIS — R53.83 OTHER FATIGUE: ICD-10-CM

## 2024-12-31 DIAGNOSIS — Z79.890 HORMONE REPLACEMENT THERAPY: ICD-10-CM

## 2024-12-31 DIAGNOSIS — L65.9 NONSCARRING HAIR LOSS, UNSPECIFIED: ICD-10-CM

## 2024-12-31 DIAGNOSIS — E55.9 VITAMIN D DEFICIENCY, UNSPECIFIED: ICD-10-CM

## 2024-12-31 DIAGNOSIS — Z00.01 ENCOUNTER FOR GENERAL ADULT MEDICAL EXAMINATION WITH ABNORMAL FINDINGS: ICD-10-CM

## 2024-12-31 DIAGNOSIS — E78.5 HYPERLIPIDEMIA, UNSPECIFIED: ICD-10-CM

## 2024-12-31 DIAGNOSIS — M25.50 PAIN IN UNSPECIFIED JOINT: ICD-10-CM

## 2024-12-31 LAB
25(OH)D3 SERPL-MCNC: 45 NG/ML (ref 30–100)
ALBUMIN SERPL BCP-MCNC: 4.5 G/DL (ref 3.4–5)
ALP SERPL-CCNC: 40 U/L (ref 33–110)
ALT SERPL W P-5'-P-CCNC: 15 U/L (ref 7–45)
AMYLASE SERPL-CCNC: 69 U/L (ref 29–103)
ANION GAP SERPL CALC-SCNC: 12 MMOL/L (ref 10–20)
AST SERPL W P-5'-P-CCNC: 18 U/L (ref 9–39)
BASOPHILS # BLD AUTO: 0.03 X10*3/UL (ref 0–0.1)
BASOPHILS NFR BLD AUTO: 0.9 %
BILIRUB SERPL-MCNC: 0.6 MG/DL (ref 0–1.2)
BUN SERPL-MCNC: 16 MG/DL (ref 6–23)
CALCIUM SERPL-MCNC: 9.8 MG/DL (ref 8.6–10.3)
CHLORIDE SERPL-SCNC: 105 MMOL/L (ref 98–107)
CHOLEST SERPL-MCNC: 292 MG/DL (ref 0–199)
CHOLESTEROL/HDL RATIO: 4
CO2 SERPL-SCNC: 25 MMOL/L (ref 21–32)
CORTIS AM PEAK SERPL-MSCNC: 12.8 UG/DL (ref 5–20)
CREAT SERPL-MCNC: 0.92 MG/DL (ref 0.5–1.05)
CRP SERPL-MCNC: <0.1 MG/DL
DHEA-S SERPL-MCNC: 83 UG/DL (ref 12–379)
EGFRCR SERPLBLD CKD-EPI 2021: 76 ML/MIN/1.73M*2
EOSINOPHIL # BLD AUTO: 0.07 X10*3/UL (ref 0–0.7)
EOSINOPHIL NFR BLD AUTO: 2 %
ERYTHROCYTE [DISTWIDTH] IN BLOOD BY AUTOMATED COUNT: 12.2 % (ref 11.5–14.5)
EST. AVERAGE GLUCOSE BLD GHB EST-MCNC: 100 MG/DL
ESTRADIOL SERPL-MCNC: 407 PG/ML
FERRITIN SERPL-MCNC: 224 NG/ML (ref 8–150)
GLUCOSE SERPL-MCNC: 84 MG/DL (ref 74–99)
HBA1C MFR BLD: 5.1 %
HCT VFR BLD AUTO: 40.2 % (ref 36–46)
HDLC SERPL-MCNC: 72.3 MG/DL
HGB BLD-MCNC: 13.6 G/DL (ref 12–16)
IMM GRANULOCYTES # BLD AUTO: 0.01 X10*3/UL (ref 0–0.7)
IMM GRANULOCYTES NFR BLD AUTO: 0.3 % (ref 0–0.9)
INSULIN P FAST SERPL-ACNC: 5 UIU/ML (ref 3–25)
LDLC SERPL CALC-MCNC: 198 MG/DL
LIPASE SERPL-CCNC: 23 U/L (ref 9–82)
LYMPHOCYTES # BLD AUTO: 1.5 X10*3/UL (ref 1.2–4.8)
LYMPHOCYTES NFR BLD AUTO: 43.5 %
MCH RBC QN AUTO: 30.9 PG (ref 26–34)
MCHC RBC AUTO-ENTMCNC: 33.8 G/DL (ref 32–36)
MCV RBC AUTO: 91 FL (ref 80–100)
MONOCYTES # BLD AUTO: 0.4 X10*3/UL (ref 0.1–1)
MONOCYTES NFR BLD AUTO: 11.6 %
NEUTROPHILS # BLD AUTO: 1.44 X10*3/UL (ref 1.2–7.7)
NEUTROPHILS NFR BLD AUTO: 41.7 %
NON HDL CHOLESTEROL: 220 MG/DL (ref 0–149)
NRBC BLD-RTO: 0 /100 WBCS (ref 0–0)
PLATELET # BLD AUTO: 287 X10*3/UL (ref 150–450)
POTASSIUM SERPL-SCNC: 4.2 MMOL/L (ref 3.5–5.3)
PROGEST SERPL-MCNC: 4 NG/ML
PROT SERPL-MCNC: 6.8 G/DL (ref 6.4–8.2)
RBC # BLD AUTO: 4.4 X10*6/UL (ref 4–5.2)
SODIUM SERPL-SCNC: 138 MMOL/L (ref 136–145)
TRIGL SERPL-MCNC: 108 MG/DL (ref 0–149)
TSH SERPL-ACNC: 2.53 MIU/L (ref 0.44–3.98)
VLDL: 22 MG/DL (ref 0–40)
WBC # BLD AUTO: 3.5 X10*3/UL (ref 4.4–11.3)

## 2024-12-31 PROCEDURE — 36415 COLL VENOUS BLD VENIPUNCTURE: CPT

## 2024-12-31 PROCEDURE — 80053 COMPREHEN METABOLIC PANEL: CPT

## 2024-12-31 PROCEDURE — 86140 C-REACTIVE PROTEIN: CPT

## 2024-12-31 PROCEDURE — 82728 ASSAY OF FERRITIN: CPT

## 2024-12-31 PROCEDURE — 80061 LIPID PANEL: CPT

## 2024-12-31 PROCEDURE — 82627 DEHYDROEPIANDROSTERONE: CPT

## 2024-12-31 PROCEDURE — 83036 HEMOGLOBIN GLYCOSYLATED A1C: CPT

## 2024-12-31 PROCEDURE — 82533 TOTAL CORTISOL: CPT

## 2024-12-31 PROCEDURE — 82670 ASSAY OF TOTAL ESTRADIOL: CPT

## 2024-12-31 PROCEDURE — 83525 ASSAY OF INSULIN: CPT

## 2024-12-31 PROCEDURE — 82306 VITAMIN D 25 HYDROXY: CPT

## 2024-12-31 PROCEDURE — 84144 ASSAY OF PROGESTERONE: CPT

## 2024-12-31 PROCEDURE — 84443 ASSAY THYROID STIM HORMONE: CPT

## 2024-12-31 PROCEDURE — 82679 ASSAY OF ESTRONE: CPT

## 2024-12-31 PROCEDURE — 85025 COMPLETE CBC W/AUTO DIFF WBC: CPT

## 2024-12-31 PROCEDURE — 84402 ASSAY OF FREE TESTOSTERONE: CPT

## 2024-12-31 PROCEDURE — 82150 ASSAY OF AMYLASE: CPT

## 2024-12-31 PROCEDURE — 83690 ASSAY OF LIPASE: CPT

## 2025-01-05 LAB — ESTRONE SERPL-MCNC: 129.5 PG/ML

## 2025-01-08 LAB
TESTOSTERONE FREE (CHAN): 3.2 PG/ML (ref 0.1–6.4)
TESTOSTERONE,TOTAL,LC-MS/MS: 55 NG/DL (ref 2–45)

## 2025-01-09 ENCOUNTER — APPOINTMENT (OUTPATIENT)
Dept: ORTHOPEDIC SURGERY | Facility: CLINIC | Age: 50
End: 2025-01-09
Payer: COMMERCIAL

## 2025-01-09 VITALS — HEIGHT: 62 IN | WEIGHT: 136 LBS | BODY MASS INDEX: 25.03 KG/M2

## 2025-01-09 DIAGNOSIS — M19.049 CMC ARTHRITIS: Primary | ICD-10-CM

## 2025-01-09 PROCEDURE — 1036F TOBACCO NON-USER: CPT | Performed by: ORTHOPAEDIC SURGERY

## 2025-01-09 PROCEDURE — 3008F BODY MASS INDEX DOCD: CPT | Performed by: ORTHOPAEDIC SURGERY

## 2025-01-09 PROCEDURE — 99211 OFF/OP EST MAY X REQ PHY/QHP: CPT | Performed by: ORTHOPAEDIC SURGERY

## 2025-01-09 ASSESSMENT — ENCOUNTER SYMPTOMS
OCCASIONAL FEELINGS OF UNSTEADINESS: 0
LOSS OF SENSATION IN FEET: 0
DEPRESSION: 0

## 2025-01-09 ASSESSMENT — PAIN DESCRIPTION - DESCRIPTORS: DESCRIPTORS: DISCOMFORT;TIGHTNESS

## 2025-01-09 ASSESSMENT — PAIN SCALES - GENERAL: PAINLEVEL_OUTOF10: 2

## 2025-01-09 ASSESSMENT — PAIN - FUNCTIONAL ASSESSMENT: PAIN_FUNCTIONAL_ASSESSMENT: 0-10

## 2025-01-11 NOTE — PROGRESS NOTES
History of Present Illness  Chief Complaint   Patient presents with    Left Hand - Post-op     10/18/24  Lt thumb CMC arthroplasty     Continues to progress activities.  Still with some residual soreness, but improving     Examination  Left thumb:  Incision is well healed. No signs of infection.  Sensation intact.  Capillary refill less than 2 seconds.  Thumb remains in good position and stable to axial load.  5/5 thumb abduction.  Able to oppose tip of thumb to MCP flexion crease of small finger     Assessment:  Patient status post left thumb CMC arthroplasty,     Plan  We once again reviewed expected recovery course and questions addressed.  She will follow-up if any persistent issues/concerns.

## 2025-01-13 ENCOUNTER — APPOINTMENT (OUTPATIENT)
Dept: ORTHOPEDIC SURGERY | Facility: CLINIC | Age: 50
End: 2025-01-13
Payer: COMMERCIAL

## 2025-03-18 ENCOUNTER — OFFICE VISIT (OUTPATIENT)
Dept: PRIMARY CARE | Facility: CLINIC | Age: 50
End: 2025-03-18
Payer: COMMERCIAL

## 2025-03-18 VITALS
BODY MASS INDEX: 22.63 KG/M2 | WEIGHT: 123 LBS | SYSTOLIC BLOOD PRESSURE: 110 MMHG | TEMPERATURE: 98.6 F | DIASTOLIC BLOOD PRESSURE: 72 MMHG | HEART RATE: 82 BPM | RESPIRATION RATE: 20 BRPM | HEIGHT: 62 IN | OXYGEN SATURATION: 98 %

## 2025-03-18 DIAGNOSIS — K58.2 IRRITABLE BOWEL SYNDROME WITH BOTH CONSTIPATION AND DIARRHEA: Primary | ICD-10-CM

## 2025-03-18 PROCEDURE — 99213 OFFICE O/P EST LOW 20 MIN: CPT | Performed by: NURSE PRACTITIONER

## 2025-03-18 PROCEDURE — 3008F BODY MASS INDEX DOCD: CPT | Performed by: NURSE PRACTITIONER

## 2025-03-18 PROCEDURE — 1036F TOBACCO NON-USER: CPT | Performed by: NURSE PRACTITIONER

## 2025-03-18 ASSESSMENT — ENCOUNTER SYMPTOMS
GASTROINTESTINAL NEGATIVE: 1
CARDIOVASCULAR NEGATIVE: 1
OCCASIONAL FEELINGS OF UNSTEADINESS: 0
LOSS OF SENSATION IN FEET: 0
DEPRESSION: 0
RESPIRATORY NEGATIVE: 1
NEUROLOGICAL NEGATIVE: 1
ARTHRALGIAS: 1
CONSTITUTIONAL NEGATIVE: 1

## 2025-03-18 ASSESSMENT — PATIENT HEALTH QUESTIONNAIRE - PHQ9
1. LITTLE INTEREST OR PLEASURE IN DOING THINGS: NOT AT ALL
SUM OF ALL RESPONSES TO PHQ9 QUESTIONS 1 AND 2: 0
2. FEELING DOWN, DEPRESSED OR HOPELESS: NOT AT ALL

## 2025-03-18 ASSESSMENT — PAIN SCALES - GENERAL: PAINLEVEL_OUTOF10: 0-NO PAIN

## 2025-03-18 NOTE — PROGRESS NOTES
"Subjective   Patient ID: Sandra Delgado is a 49 y.o. female who presents for New Patient Visit (NEW PATIENT HERE TO GET ESTABLISHED. WOULD LIKE TO GO OVER BLOOD WORK THAT IS LOADED IN THE SYSTEM FROM A PREVIOUS DOCTOR.).    HPI Has history of auto immune disorder, lyme disease , joint pain and inflammation, seeing nutritionist for dietary management. Has seen allergiest, endocrinologist from [place], on hormone replacement therapy.  Would like celiac testing.  Prev allergy testing she was not eatting gluten,     Review of Systems   Constitutional: Negative.    HENT: Negative.     Respiratory: Negative.     Cardiovascular: Negative.    Gastrointestinal: Negative.    Genitourinary: Negative.    Musculoskeletal:  Positive for arthralgias.   Neurological: Negative.        Objective   /72 (BP Location: Right arm, Patient Position: Sitting, BP Cuff Size: Adult)   Pulse 82   Temp 37 °C (98.6 °F) (Skin)   Resp 20   Ht 1.575 m (5' 2\")   Wt 55.8 kg (123 lb)   SpO2 98%   BMI 22.50 kg/m²     Physical Exam  Constitutional:       General: She is not in acute distress.     Appearance: Normal appearance.   Neurological:      General: No focal deficit present.      Mental Status: She is alert.   Psychiatric:         Mood and Affect: Mood normal.         Assessment/Plan   Problem List Items Addressed This Visit    None  Visit Diagnoses         Codes    Irritable bowel syndrome with both constipation and diarrhea    -  Primary K58.2        Follow up CPE        "

## 2025-03-20 LAB
GLIADIN IGA SER IA-ACNC: <1 U/ML
GLIADIN IGG SER IA-ACNC: <1 U/ML
IGA SERPL-MCNC: 103 MG/DL (ref 47–310)
TTG IGA SER-ACNC: <1 U/ML
TTG IGG SER-ACNC: <1 U/ML

## 2025-04-07 ENCOUNTER — OFFICE VISIT (OUTPATIENT)
Dept: PRIMARY CARE | Facility: CLINIC | Age: 50
End: 2025-04-07
Payer: COMMERCIAL

## 2025-04-07 VITALS
WEIGHT: 124 LBS | SYSTOLIC BLOOD PRESSURE: 100 MMHG | OXYGEN SATURATION: 99 % | TEMPERATURE: 98.6 F | HEIGHT: 62 IN | RESPIRATION RATE: 20 BRPM | HEART RATE: 68 BPM | BODY MASS INDEX: 22.82 KG/M2 | DIASTOLIC BLOOD PRESSURE: 70 MMHG

## 2025-04-07 DIAGNOSIS — N64.4 BREAST PAIN: ICD-10-CM

## 2025-04-07 DIAGNOSIS — M19.049 CMC ARTHRITIS: ICD-10-CM

## 2025-04-07 DIAGNOSIS — L30.9 DERMATITIS: ICD-10-CM

## 2025-04-07 DIAGNOSIS — Z00.00 ROUTINE GENERAL MEDICAL EXAMINATION AT A HEALTH CARE FACILITY: Primary | ICD-10-CM

## 2025-04-07 DIAGNOSIS — F90.9 ATTENTION DEFICIT HYPERACTIVITY DISORDER (ADHD), UNSPECIFIED ADHD TYPE: ICD-10-CM

## 2025-04-07 PROCEDURE — 3008F BODY MASS INDEX DOCD: CPT | Performed by: NURSE PRACTITIONER

## 2025-04-07 PROCEDURE — 1036F TOBACCO NON-USER: CPT | Performed by: NURSE PRACTITIONER

## 2025-04-07 PROCEDURE — 99396 PREV VISIT EST AGE 40-64: CPT | Performed by: NURSE PRACTITIONER

## 2025-04-07 RX ORDER — LEVOCETIRIZINE DIHYDROCHLORIDE 5 MG/1
5 TABLET, FILM COATED ORAL EVERY EVENING
COMMUNITY

## 2025-04-07 RX ORDER — CLOBETASOL PROPIONATE 0.5 MG/G
OINTMENT TOPICAL 2 TIMES DAILY
Qty: 30 G | Refills: 2 | Status: SHIPPED | OUTPATIENT
Start: 2025-04-07

## 2025-04-07 RX ORDER — TESTOSTERONE GEL, 1% 10 MG/G
50 GEL TRANSDERMAL DAILY
COMMUNITY

## 2025-04-07 RX ORDER — THYROID 120 MG/1
120 TABLET ORAL DAILY
COMMUNITY

## 2025-04-07 RX ORDER — SYRING-NEEDL,DISP,INSUL,0.3 ML 29 G X1/2"
296 SYRINGE, EMPTY DISPOSABLE MISCELLANEOUS ONCE
COMMUNITY

## 2025-04-07 ASSESSMENT — ENCOUNTER SYMPTOMS
LOSS OF SENSATION IN FEET: 0
RESPIRATORY NEGATIVE: 1
NEUROLOGICAL NEGATIVE: 1
OCCASIONAL FEELINGS OF UNSTEADINESS: 0
CONSTITUTIONAL NEGATIVE: 1
MUSCULOSKELETAL NEGATIVE: 1
DEPRESSION: 0
GASTROINTESTINAL NEGATIVE: 1
CARDIOVASCULAR NEGATIVE: 1

## 2025-04-07 ASSESSMENT — PROMIS GLOBAL HEALTH SCALE
RATE_PHYSICAL_HEALTH: GOOD
RATE_GENERAL_HEALTH: GOOD
RATE_QUALITY_OF_LIFE: GOOD
CARRYOUT_PHYSICAL_ACTIVITIES: MOSTLY
EMOTIONAL_PROBLEMS: SOMETIMES
RATE_SOCIAL_SATISFACTION: GOOD
CARRYOUT_SOCIAL_ACTIVITIES: FAIR
RATE_AVERAGE_FATIGUE: MODERATE
RATE_AVERAGE_PAIN: 5
RATE_MENTAL_HEALTH: GOOD

## 2025-04-07 ASSESSMENT — PAIN SCALES - GENERAL: PAINLEVEL_OUTOF10: 0-NO PAIN

## 2025-04-07 NOTE — PROGRESS NOTES
"Subjective   Patient ID: Sandra Delgado is a 49 y.o. female who presents for No chief complaint on file..    HPI Has been following functional medicine for testerone, thryoid and semiglutide has issues with   Hsterectomy 2015-16 looking for OBGYn   Cologuard 2023  History of multiple allergies   Seeing pyshiatrist for mental health , allerall,and clonazepam   Review of Systems   Constitutional: Negative.    HENT: Negative.     Respiratory: Negative.     Cardiovascular: Negative.    Gastrointestinal: Negative.    Genitourinary: Negative.    Musculoskeletal: Negative.    Neurological: Negative.        Objective   /70 (BP Location: Right arm, Patient Position: Sitting, BP Cuff Size: Adult)   Pulse 68   Temp 37 °C (98.6 °F) (Skin)   Resp 20   Ht 1.575 m (5' 2\")   Wt 56.2 kg (124 lb)   SpO2 99%   BMI 22.68 kg/m²     Physical Exam  Vitals and nursing note reviewed.   Constitutional:       General: She is not in acute distress.  HENT:      Right Ear: Tympanic membrane and ear canal normal.      Left Ear: Tympanic membrane and ear canal normal.      Nose: Nose normal. No rhinorrhea.      Mouth/Throat:      Pharynx: Oropharynx is clear. No oropharyngeal exudate or posterior oropharyngeal erythema.      Comments: Dentition wnl  Eyes:      Extraocular Movements: Extraocular movements intact.      Conjunctiva/sclera: Conjunctivae normal.      Pupils: Pupils are equal, round, and reactive to light.   Neck:      Vascular: No carotid bruit.   Cardiovascular:      Rate and Rhythm: Normal rate and regular rhythm.      Heart sounds: Normal heart sounds. No murmur heard.  Pulmonary:      Breath sounds: Normal breath sounds. No wheezing or rhonchi.   Abdominal:      General: Bowel sounds are normal. There is no distension.      Palpations: Abdomen is soft. There is no mass.      Tenderness: There is no abdominal tenderness. There is no guarding or rebound.      Hernia: No hernia is present.   Musculoskeletal:         " General: No swelling or tenderness. Normal range of motion.      Cervical back: Normal range of motion and neck supple.   Lymphadenopathy:      Cervical: No cervical adenopathy.   Skin:     General: Skin is warm.      Findings: No rash.   Neurological:      General: No focal deficit present.      Mental Status: She is alert.         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

## 2025-04-10 ENCOUNTER — HOSPITAL ENCOUNTER (OUTPATIENT)
Dept: RADIOLOGY | Facility: EXTERNAL LOCATION | Age: 50
Discharge: HOME | End: 2025-04-10

## 2025-04-10 DIAGNOSIS — N64.4 BREAST PAIN: ICD-10-CM

## 2025-04-11 ENCOUNTER — TELEPHONE (OUTPATIENT)
Dept: PRIMARY CARE | Facility: CLINIC | Age: 50
End: 2025-04-11
Payer: COMMERCIAL

## 2025-04-11 ENCOUNTER — HOSPITAL ENCOUNTER (OUTPATIENT)
Dept: RADIOLOGY | Facility: HOSPITAL | Age: 50
Discharge: HOME | End: 2025-04-11
Payer: COMMERCIAL

## 2025-04-11 DIAGNOSIS — N64.4 BREAST PAIN: ICD-10-CM

## 2025-04-11 PROCEDURE — 76642 ULTRASOUND BREAST LIMITED: CPT

## 2025-06-10 ENCOUNTER — TELEPHONE (OUTPATIENT)
Dept: ENDOCRINOLOGY | Facility: CLINIC | Age: 50
End: 2025-06-10
Payer: COMMERCIAL

## 2025-06-10 NOTE — TELEPHONE ENCOUNTER
Patient called the [facility] and left a message asking for a call back from our office. I did reach out and lvm for the patient to call back

## 2025-06-12 ENCOUNTER — TELEPHONE (OUTPATIENT)
Dept: ENDOCRINOLOGY | Facility: CLINIC | Age: 50
End: 2025-06-12
Payer: COMMERCIAL

## 2025-06-12 NOTE — TELEPHONE ENCOUNTER
Patient called to see what lab work she would need her doctor to order for the next first appointment.

## 2025-06-12 NOTE — TELEPHONE ENCOUNTER
When calling patient she did state she was not answering the phone due to caller id not showing [facility], we did try and see what would up up and no caller uh sign matt showed when we called

## 2025-06-24 ENCOUNTER — APPOINTMENT (OUTPATIENT)
Dept: ENDOCRINOLOGY | Facility: CLINIC | Age: 50
End: 2025-06-24
Payer: COMMERCIAL

## 2026-01-27 ENCOUNTER — APPOINTMENT (OUTPATIENT)
Dept: ENDOCRINOLOGY | Facility: CLINIC | Age: 51
End: 2026-01-27
Payer: COMMERCIAL

## (undated) DEVICE — CLOSURE SYSTEM, DERMABOND, PRINEO, 22CM, STERILE

## (undated) DEVICE — HOLSTER, BOVIE, ES PENCIL, DISP

## (undated) DEVICE — Device

## (undated) DEVICE — STOCKINETTE, TUBE, BLN, ST, 1 PLY, 4 X 48 IN, LF

## (undated) DEVICE — SUTURE, VICRYL, 3-0, 27IN, RB-1

## (undated) DEVICE — SOLUTION, IRRIGATION, SODIUM CHLORIDE 0.9%, 1000 ML, POUR BOTTLE

## (undated) DEVICE — SLING, ARM, LARGE

## (undated) DEVICE — SUTURE, ETHILON, 4-0, 18, PS2, BLACK

## (undated) DEVICE — PADDING, WEBRIL, UNDERCAST, STERILE, 3 IN

## (undated) DEVICE — BANDAGE, ELASTIC, MATRIX, SELF-CLOSURE, 3 IN X 5 YD, LF

## (undated) DEVICE — CUFF, TOURNIQUET, 18 X 4, SNGL PORT/SNGL BLADDER, DISP, LF

## (undated) DEVICE — PREP TRAY, SKIN, DRY, W/GLOVES

## (undated) DEVICE — SUTURE, MONOCRYL, 4-0, 18 IN, PS2, UNDYED